# Patient Record
Sex: FEMALE
[De-identification: names, ages, dates, MRNs, and addresses within clinical notes are randomized per-mention and may not be internally consistent; named-entity substitution may affect disease eponyms.]

---

## 2021-07-14 PROBLEM — Z00.00 ENCOUNTER FOR PREVENTIVE HEALTH EXAMINATION: Status: ACTIVE | Noted: 2021-07-14

## 2021-07-21 ENCOUNTER — APPOINTMENT (OUTPATIENT)
Dept: CARDIOLOGY | Facility: CLINIC | Age: 76
End: 2021-07-21
Payer: MEDICARE

## 2021-07-21 ENCOUNTER — NON-APPOINTMENT (OUTPATIENT)
Age: 76
End: 2021-07-21

## 2021-07-21 VITALS — DIASTOLIC BLOOD PRESSURE: 70 MMHG | SYSTOLIC BLOOD PRESSURE: 140 MMHG

## 2021-07-21 VITALS
WEIGHT: 258 LBS | SYSTOLIC BLOOD PRESSURE: 134 MMHG | OXYGEN SATURATION: 95 % | HEART RATE: 107 BPM | BODY MASS INDEX: 38.21 KG/M2 | TEMPERATURE: 97.8 F | DIASTOLIC BLOOD PRESSURE: 80 MMHG | HEIGHT: 69 IN

## 2021-07-21 DIAGNOSIS — Z82.3 FAMILY HISTORY OF STROKE: ICD-10-CM

## 2021-07-21 DIAGNOSIS — E11.9 TYPE 2 DIABETES MELLITUS W/OUT COMPLICATIONS: ICD-10-CM

## 2021-07-21 DIAGNOSIS — M19.90 UNSPECIFIED OSTEOARTHRITIS, UNSPECIFIED SITE: ICD-10-CM

## 2021-07-21 DIAGNOSIS — Z72.3 LACK OF PHYSICAL EXERCISE: ICD-10-CM

## 2021-07-21 DIAGNOSIS — E66.9 OBESITY, UNSPECIFIED: ICD-10-CM

## 2021-07-21 DIAGNOSIS — Z72.89 OTHER PROBLEMS RELATED TO LIFESTYLE: ICD-10-CM

## 2021-07-21 DIAGNOSIS — G62.9 POLYNEUROPATHY, UNSPECIFIED: ICD-10-CM

## 2021-07-21 DIAGNOSIS — R26.81 UNSTEADINESS ON FEET: ICD-10-CM

## 2021-07-21 DIAGNOSIS — Z82.49 FAMILY HISTORY OF ISCHEMIC HEART DISEASE AND OTHER DISEASES OF THE CIRCULATORY SYSTEM: ICD-10-CM

## 2021-07-21 PROCEDURE — 93242 EXT ECG>48HR<7D RECORDING: CPT

## 2021-07-21 PROCEDURE — 93000 ELECTROCARDIOGRAM COMPLETE: CPT

## 2021-07-21 PROCEDURE — 99205 OFFICE O/P NEW HI 60 MIN: CPT

## 2021-07-21 RX ORDER — EMPAGLIFLOZIN 25 MG/1
25 TABLET, FILM COATED ORAL DAILY
Refills: 0 | Status: ACTIVE | COMMUNITY

## 2021-07-21 RX ORDER — INSULIN GLARGINE 100 [IU]/ML
INJECTION, SOLUTION SUBCUTANEOUS
Refills: 0 | Status: ACTIVE | COMMUNITY

## 2021-07-21 RX ORDER — SIMVASTATIN 20 MG/1
20 TABLET, FILM COATED ORAL
Qty: 90 | Refills: 3 | Status: ACTIVE | COMMUNITY

## 2021-07-21 RX ORDER — METFORMIN HYDROCHLORIDE 1000 MG/1
1000 TABLET, COATED ORAL TWICE DAILY
Refills: 0 | Status: ACTIVE | COMMUNITY

## 2021-07-21 RX ORDER — APIXABAN 5 MG/1
5 TABLET, FILM COATED ORAL
Qty: 90 | Refills: 3 | Status: ACTIVE | COMMUNITY

## 2021-07-21 RX ORDER — TEMAZEPAM 15 MG/1
15 CAPSULE ORAL
Refills: 0 | Status: ACTIVE | COMMUNITY

## 2021-07-21 RX ORDER — SEMAGLUTIDE 1.34 MG/ML
2 INJECTION, SOLUTION SUBCUTANEOUS
Refills: 0 | Status: ACTIVE | COMMUNITY

## 2021-07-21 RX ORDER — AMLODIPINE BESYLATE 5 MG/1
5 TABLET ORAL
Qty: 90 | Refills: 1 | Status: ACTIVE | COMMUNITY

## 2021-07-21 NOTE — CARDIOLOGY SUMMARY
[de-identified] : EKG June 23, 2021 atrial fibrillation with rapid ventricular rate low voltage nonspecific ST-T changes.\par EKG July 21, 2021 normal sinus rhythm

## 2021-07-21 NOTE — ASSESSMENT
[FreeTextEntry1] : Reviewed July 2121.\par EKG is reviewed\par Recent labs June 23, 2021 sodium 140 potassium 5.0 creatinine 0.81 LFT normal hemoglobin 14.5 platelet count 279 LDL 65 HDL 51 triglycerides 147 total cholesterol 145 hemoglobin A1c 6.7 GFR 70

## 2021-07-21 NOTE — DISCUSSION/SUMMARY
[FreeTextEntry1] : 75-year-old female with above medical history active medical problems as noted below\par 1.  New diagnosis of paroxysmal atrial fibrillation.\par Pathophysiology reviewed with her.\par Paroxysmal nature of the arrhythmias and relatively asymptomatic state discussed.\par CHADS2 VASC 2 score of 5.  Moderate to high risk category at 7.2% risk of a stroke.\par We have discussed rate versus rhythm control strategy.  \par Risk benefits alternatives of high risk medication\par In the form of Eliquis was reviewed at length.\par Event monitoring applied to evaluate for atrial fibrillation burden\par Echocardiogram to evaluate LV ejection fraction, left atrial size, pulmonary artery systolic pressure.\par Based on above evaluation we can discuss further whether she would benefit from no changes in medication versus antiarrhythmic medication like Multaq or consider EP evaluation for ablation.  \par Avoidance of alcohol reviewed\par Thyroid profile if it is not checked recommended.\par 2.  Lower extremity edema.  Venous insufficiency.  Rule out pulmonary hypertension and right heart failure.\par Furosemide 20 mg added.\par Low-salt diet.  Leg elevation.\par Once edema improves as needed use of furosemide or restarting spironolactone.\par Labs ordered.\par 3.  Essential hypertension.  Reasonably well controlled.  Continue amlodipine atenolol at present.  Though in presence of diabetes mellitus considering renal function is stable would prefer to put her on angiotensin receptor blocker or ACE inhibitor.  We will discuss this further once I have further information.  Goal less than 130/80\par 4.  Hyperlipidemia.  On statin therapy.  LDL less than 70.  Continue lifestyle modifications.  Goal less than 70.\par \par 5.  Obesity.  Lifestyle modification weight reduction is strongly recommended.\par #6 abnormal EKG with tachycardia and atrial fibrillation.  Multiple risk factors for coronary atherosclerotic vascular disease.  Recommended pharmacological myocardial perfusion scan as she is unable to do exercise.  She is at intermediate to high risk for having obstructive coronary artery disease.  This may also allow us and help us to decide about antiarrhythmic medication if needed.\par Technetium 99 radioisotope use was reviewed.  Risks, benefits, alternatives of use of radioisotope was discussed at length.  Patient verbalized understanding and agreed with the management plan.\par \par Counseling regarding low saturated fat, salt and carbohydrate intake was reviewed. Active lifestyle and regular. Exercise along with weight management is advised.\par All the above were at length reviewed. Answered all the questions. Thank you very much for this kind referral. Please do not hesitate to give me a call for any question.\par Part of this transcription was done with voice recognition software and phonetically similar errors are common. I apologize for that. Please do not hesitate to call for any questions due to above.\par Extensive review of the new cardiovascular finding, use of high risk medication, multiple cardiovascular test evaluation management was done.\par

## 2021-07-21 NOTE — HISTORY OF PRESENT ILLNESS
[FreeTextEntry1] : Medical history mainly significant for\par Recent diagnosis of atrial fibrillation.\par Essential hypertension\par Type 2 diabetes mellitus\par Obesity\par Dyslipidemia\par Lower extremity edema\par \par No history of myocardial infarction, cerebrovascular accident, bleeding complications, recent trauma\par

## 2021-07-21 NOTE — REVIEW OF SYSTEMS
[Negative] : Heme/Lymph [Fever] : no fever [Headache] : no headache [Weight Gain (___ Lbs)] : [unfilled] ~Ulb weight gain [Chills] : no chills [Feeling Fatigued] : feeling fatigued [Weight Loss (___ Lbs)] : no recent weight loss [SOB] : shortness of breath [Dyspnea on exertion] : dyspnea during exertion [Chest Discomfort] : no chest discomfort [Lower Ext Edema] : lower extremity edema [Leg Claudication] : no intermittent leg claudication [Palpitations] : no palpitations [Orthopnea] : no orthopnea [PND] : no PND [Syncope] : no syncope [Nausea] : nausea [Joint Pain] : joint pain [Joint Stiffness] : joint stiffness

## 2021-07-21 NOTE — PHYSICAL EXAM
[Well Developed] : well developed [Well Nourished] : well nourished [No Acute Distress] : no acute distress [Normal Conjunctiva] : normal conjunctiva [No Carotid Bruit] : no carotid bruit [Normal S1, S2] : normal S1, S2 [No Murmur] : no murmur [No Rub] : no rub [No Gallop] : no gallop [No Respiratory Distress] : no respiratory distress  [No Cyanosis] : no cyanosis [No Clubbing] : no clubbing [No Varicosities] : no varicosities [No Rash] : no rash [No Skin Lesions] : no skin lesions [Moves all extremities] : moves all extremities [No Focal Deficits] : no focal deficits [Normal Speech] : normal speech [Alert and Oriented] : alert and oriented [Normal memory] : normal memory [Obese] : obese [Elevated JVD ____cm] : elevated JVD ~Vcm [Clear Lung Fields] : clear lung fields [Abnormal Gait] : abnormal gait [Edema ___] : edema [unfilled] [Venous stasis] : venous stasis [de-identified] : Distant heart sounds [de-identified] : Decreased air entry [de-identified] : Obese [de-identified] : Uses cane to walk [de-identified] : Unable to palpate pulses because of edema

## 2021-07-21 NOTE — REASON FOR VISIT
[Symptom and Test Evaluation] : symptom and test evaluation [Arrhythmia/ECG Abnorrmalities] : arrhythmia/ECG abnormalities [Hyperlipidemia] : hyperlipidemia [Hypertension] : hypertension [FreeTextEntry3] : Dr. Sosa [FreeTextEntry1] : 75-year-old white female is referred to me for consultation in presence of recent office visit on June 23, 2021 showed atrial fibrillation.\par According to her she was traveling to [place].  Did not feel well.  Was very nauseous.  Did not feel any palpitations.  Did not have any shortness of breath chest pain dizziness lightheadedness.  She did feel fatigued at the time.\par EKG done at 's office showed atrial fibrillation.  Rapid ventricular rate low voltage complexes nonspecific ST-T changes.  She was started on Eliquis.  And was referred for cardiac consultation.  Since that day she has not had any significant problems.\par Her activity level is very limited.\par She has increasing edema over the last few weeks.  After stopping spironolactone.\par She has exertional dyspnea and dyspnea at rest which is stable.  Without any PND.\par She has no chest pain arm pain jaw pain diaphoresis.\par She has not had any significant cardiac evaluation recently\par No bleeding complications\par No recent hospital admission\par No diagnosis of sleep apnea\par 1 glass of wine a day at the most.

## 2021-08-19 ENCOUNTER — NON-APPOINTMENT (OUTPATIENT)
Age: 76
End: 2021-08-19

## 2021-08-20 PROCEDURE — 93244 EXT ECG>48HR<7D REV&INTERPJ: CPT

## 2021-09-14 ENCOUNTER — APPOINTMENT (OUTPATIENT)
Dept: CARDIOLOGY | Facility: CLINIC | Age: 76
End: 2021-09-14

## 2021-09-21 ENCOUNTER — APPOINTMENT (OUTPATIENT)
Dept: CARDIOLOGY | Facility: CLINIC | Age: 76
End: 2021-09-21

## 2021-10-14 ENCOUNTER — APPOINTMENT (OUTPATIENT)
Dept: CARDIOLOGY | Facility: CLINIC | Age: 76
End: 2021-10-14

## 2021-10-19 ENCOUNTER — APPOINTMENT (OUTPATIENT)
Dept: CARDIOLOGY | Facility: CLINIC | Age: 76
End: 2021-10-19

## 2021-11-17 ENCOUNTER — APPOINTMENT (OUTPATIENT)
Dept: CARDIOLOGY | Facility: CLINIC | Age: 76
End: 2021-11-17
Payer: MEDICARE

## 2021-11-17 VITALS
OXYGEN SATURATION: 98 % | HEART RATE: 79 BPM | DIASTOLIC BLOOD PRESSURE: 80 MMHG | WEIGHT: 232 LBS | TEMPERATURE: 97.1 F | BODY MASS INDEX: 34.36 KG/M2 | SYSTOLIC BLOOD PRESSURE: 124 MMHG | HEIGHT: 69 IN

## 2021-11-17 DIAGNOSIS — R60.0 LOCALIZED EDEMA: ICD-10-CM

## 2021-11-17 DIAGNOSIS — Z79.899 OTHER LONG TERM (CURRENT) DRUG THERAPY: ICD-10-CM

## 2021-11-17 DIAGNOSIS — I47.1 SUPRAVENTRICULAR TACHYCARDIA: ICD-10-CM

## 2021-11-17 DIAGNOSIS — I48.0 PAROXYSMAL ATRIAL FIBRILLATION: ICD-10-CM

## 2021-11-17 DIAGNOSIS — I10 ESSENTIAL (PRIMARY) HYPERTENSION: ICD-10-CM

## 2021-11-17 DIAGNOSIS — Z79.01 LONG TERM (CURRENT) USE OF ANTICOAGULANTS: ICD-10-CM

## 2021-11-17 DIAGNOSIS — I48.91 UNSPECIFIED ATRIAL FIBRILLATION: ICD-10-CM

## 2021-11-17 PROCEDURE — 99214 OFFICE O/P EST MOD 30 MIN: CPT

## 2021-11-17 PROCEDURE — 93306 TTE W/DOPPLER COMPLETE: CPT

## 2021-11-17 RX ORDER — FUROSEMIDE 20 MG/1
20 TABLET ORAL AS DIRECTED
Refills: 0 | Status: ACTIVE | COMMUNITY
Start: 2021-07-21

## 2021-11-17 RX ORDER — ATENOLOL 25 MG/1
25 TABLET ORAL TWICE DAILY
Qty: 180 | Refills: 1 | Status: ACTIVE | COMMUNITY

## 2021-11-17 RX ORDER — SPIRONOLACTONE 25 MG/1
25 TABLET ORAL DAILY
Refills: 0 | Status: ACTIVE | COMMUNITY
Start: 2021-11-17

## 2021-11-17 NOTE — ASSESSMENT
[FreeTextEntry1] : Reviewed July 2121.\par EKG is reviewed\par Recent labs June 23, 2021 sodium 140 potassium 5.0 creatinine 0.81 LFT normal hemoglobin 14.5 platelet count 279 LDL 65 HDL 51 triglycerides 147 total cholesterol 145 hemoglobin A1c 6.7 GFR 70\par \par Reviewed on November 17, 2021\par Event monitor, echocardiogram were reviewed.  I do not have any recent labs since June.

## 2021-11-17 NOTE — PHYSICAL EXAM
[Well Developed] : well developed [Well Nourished] : well nourished [No Acute Distress] : no acute distress [Obese] : obese [Normal Conjunctiva] : normal conjunctiva [No Carotid Bruit] : no carotid bruit [Elevated JVD ____cm] : elevated JVD ~Vcm [Normal S1, S2] : normal S1, S2 [No Murmur] : no murmur [No Rub] : no rub [No Gallop] : no gallop [Clear Lung Fields] : clear lung fields [No Respiratory Distress] : no respiratory distress  [Abnormal Gait] : abnormal gait [No Cyanosis] : no cyanosis [No Clubbing] : no clubbing [No Varicosities] : no varicosities [Edema ___] : edema [unfilled] [Venous stasis] : venous stasis [No Rash] : no rash [No Skin Lesions] : no skin lesions [Moves all extremities] : moves all extremities [No Focal Deficits] : no focal deficits [Normal Speech] : normal speech [Alert and Oriented] : alert and oriented [Normal memory] : normal memory [de-identified] : Distant heart sounds [de-identified] : Decreased air entry [de-identified] : Obese [de-identified] : Uses cane to walk [de-identified] : Unable to palpate pulses because of edema

## 2021-11-17 NOTE — REVIEW OF SYSTEMS
[Weight Gain (___ Lbs)] : [unfilled] ~Ulb weight gain [Feeling Fatigued] : feeling fatigued [Dyspnea on exertion] : dyspnea during exertion [SOB] : shortness of breath [Lower Ext Edema] : lower extremity edema [Joint Pain] : joint pain [Joint Stiffness] : joint stiffness [Negative] : Heme/Lymph [Fever] : no fever [Headache] : no headache [Chills] : no chills [Weight Loss (___ Lbs)] : no recent weight loss [Chest Discomfort] : no chest discomfort [Leg Claudication] : no intermittent leg claudication [Palpitations] : no palpitations [Orthopnea] : no orthopnea [PND] : no PND [Syncope] : no syncope [Nausea] : no nausea

## 2021-11-17 NOTE — DISCUSSION/SUMMARY
[FreeTextEntry1] : 75-year-old female with above medical history active medical problems as noted below\par 1.   paroxysmal atrial fibrillation.\par Paroxysmal nature of the arrhythmias and relatively asymptomatic state discussed.\par CHADS2 VASC 2 score of 5.  Moderate to high risk category at 7.2% risk of a stroke.\par We have discussed rate versus rhythm control strategy.  \par No significant atrial fibrillation in 2 weeks of event monitor.  Brief episodes of PSVT or sequential PACs.\par Echocardiogram shows preserved LV systolic function.  No significant pulmonary hypertension.  Normal left atrium.\par Labs ordered.\par Importance of regular labs in presence of high risk medication use was reviewed.  Watch for bleeding complications.\par 2.  Lower extremity edema.  Venous insufficiency.  No significant evidence of pulmonary pretension or right heart failure on echocardiogram.  Still persistent edema.  She will take furosemide only as needed.  She will continue with her spironolactone.  Does understand need for close follow-up on labs..\par Low-salt diet.  Leg elevation.\par Labs ordered.\par And consider compression stockings.\par 3.  Essential hypertension.  Reasonably well controlled.  Continue amlodipine, atenolol at present.  Though in presence of diabetes mellitus considering renal function is stable would prefer to put her on angiotensin receptor blocker or ACE inhibitor.  We will discuss this further once I have further information.  Goal less than 130/80\par 4.  Hyperlipidemia.  On statin therapy.  LDL less than 70.  Continue lifestyle modifications.  Goal less than 70.\par 5.  Obesity.  Lifestyle modification weight reduction is strongly recommended.\par 6 abnormal EKG with tachycardia and atrial fibrillation.  Multiple risk factors for coronary atherosclerotic vascular disease.  Recommended pharmacological myocardial perfusion scan as she is unable to do exercise.  She is at intermediate to high risk for having obstructive coronary artery disease.  This may also allow us and help us to decide about antiarrhythmic medication if needed.\par She declined to do myocardial perfusion scan.  Understands risk benefits alternatives.  Continue lifestyle and risk factor modifications.\par \par Counseling regarding low saturated fat, salt and carbohydrate intake was reviewed. Active lifestyle and regular. Exercise along with weight management is advised.\par All the above were at length reviewed. Answered all the questions. Thank you very much for this kind referral. Please do not hesitate to give me a call for any question.\par Part of this transcription was done with voice recognition software and phonetically similar errors are common. I apologize for that. Please do not hesitate to call for any questions due to above.\par Extensive review of the new cardiovascular finding, use of high risk medication, multiple cardiovascular test evaluation management was done.\par

## 2021-11-17 NOTE — REASON FOR VISIT
[Symptom and Test Evaluation] : symptom and test evaluation [Arrhythmia/ECG Abnorrmalities] : arrhythmia/ECG abnormalities [Hyperlipidemia] : hyperlipidemia [Hypertension] : hypertension [FreeTextEntry3] : Dr. Sosa [FreeTextEntry1] : 75-year-old white female is referred to me for follow-up consultation in presence of recent office visit on June 23, 2021 showed atrial fibrillation.  Reviewed event monitor, echocardiogram.\par As you know recently she was traveling to [place].  Did not feel well.  Was very nauseous.  Did not feel any palpitations.  Did not have any shortness of breath chest pain dizziness lightheadedness.  She did feel fatigued at the time.\par EKG done at 's office showed atrial fibrillation.  Rapid ventricular rate low voltage complexes nonspecific ST-T changes.  She was started on Eliquis.  And was referred for cardiac consultation.  Since that day she has not had any significant problems.\par Her activity level is very limited.\par She has not been taking furosemide regularly.  She has restarted taking her spironolactone as advised by your office at the half a tablet daily.  She does have edema though according to her has improved significantly.\par She has exertional dyspnea and dyspnea at rest which is stable.  Without any PND.\par She has no chest pain arm pain jaw pain diaphoresis.\par She has not had any significant cardiac evaluation recently\par No bleeding complications\par No recent hospital admission\par No diagnosis of sleep apnea\par 1 glass of wine a day at the most.

## 2021-11-17 NOTE — CARDIOLOGY SUMMARY
[de-identified] : EKG June 23, 2021 atrial fibrillation with rapid ventricular rate low voltage nonspecific ST-T changes.\par EKG July 21, 2021 normal sinus rhythm [de-identified] : Event monitoring.  3 days 16 hours.  July 21, 2021.  Normal sinus rhythm average 77 occasional APC.  Sequential APCs up to 8 beats.  Rare isolated VPC. [de-identified] : November 17, 2021.  Preserved LV systolic function. LVEf 55-60%  Mitral annular calcification.  Aortic valve thickening.

## 2022-06-27 ENCOUNTER — OFFICE (OUTPATIENT)
Dept: URBAN - METROPOLITAN AREA CLINIC 8 | Facility: CLINIC | Age: 77
Setting detail: OPHTHALMOLOGY
End: 2022-06-27
Payer: MEDICARE

## 2022-06-27 ENCOUNTER — OFFICE (OUTPATIENT)
Dept: URBAN - METROPOLITAN AREA CLINIC 8 | Facility: CLINIC | Age: 77
Setting detail: OPHTHALMOLOGY
End: 2022-06-27

## 2022-06-27 DIAGNOSIS — H90.3: ICD-10-CM

## 2022-06-27 DIAGNOSIS — H25.13: ICD-10-CM

## 2022-06-27 DIAGNOSIS — E11.9: ICD-10-CM

## 2022-06-27 PROCEDURE — 92004 COMPRE OPH EXAM NEW PT 1/>: CPT | Performed by: OPHTHALMOLOGY

## 2022-06-27 PROCEDURE — 92551 PURE TONE HEARING TEST AIR: CPT | Performed by: AUDIOLOGIST

## 2022-06-27 PROCEDURE — 92567 TYMPANOMETRY: CPT | Performed by: AUDIOLOGIST

## 2022-06-27 ASSESSMENT — KERATOMETRY
METHOD_AUTO_MANUAL: AUTO
OS_K1POWER_DIOPTERS: 44.00
OS_K2POWER_DIOPTERS: 44.25
OD_AXISANGLE_DEGREES: 090
OD_K2POWER_DIOPTERS: 44.25
OD_K1POWER_DIOPTERS: 44.25
OS_AXISANGLE_DEGREES: 058

## 2022-06-27 ASSESSMENT — REFRACTION_MANIFEST
OD_AXIS: 095
OD_VA1: 20/25-2
OD_CYLINDER: -1.00
OS_VA1: 20/30
OD_SPHERE: +0.50
OD_SPHERE: +0.50
OD_VA1: 20/25-2
OU_VA: 20/25-2
OS_CYLINDER: -0.50
OD_CYLINDER: -1.25
OS_AXIS: 095
OD_VA2: 20/20(J1+)
OS_SPHERE: +0.50
OS_VA1: 20/30
OS_ADD: +2.75
OS_VA2: 20/20(J1+)
OD_ADD: +2.75
OD_ADD: +2.75
OS_ADD: +2.75
OD_VA2: 20/20(J1+)
OU_VA: 20/25-2
OS_CYLINDER: -0.50
OS_SPHERE: +0.50
OS_AXIS: 095
OD_AXIS: 095
OS_VA2: 20/20(J1+)

## 2022-06-27 ASSESSMENT — REFRACTION_CURRENTRX
OD_VPRISM_DIRECTION: SV
OS_CYLINDER: -1.25
OS_SPHERE: +3.25
OS_OVR_VA: 20/
OD_SPHERE: +3.25
OD_AXIS: 101
OS_VPRISM_DIRECTION: SV
OS_AXIS: 093
OD_CYLINDER: -1.00
OD_OVR_VA: 20/

## 2022-06-27 ASSESSMENT — REFRACTION_AUTOREFRACTION
OD_CYLINDER: -2.00
OS_AXIS: 095
OD_AXIS: 096
OS_SPHERE: +1.00
OS_CYLINDER: -1.00
OD_SPHERE: +1.00

## 2022-06-27 ASSESSMENT — SPHEQUIV_DERIVED
OD_SPHEQUIV: 0
OS_SPHEQUIV: 0.25
OD_SPHEQUIV: 0
OS_SPHEQUIV: 0.5
OD_SPHEQUIV: -0.125
OS_SPHEQUIV: 0.25

## 2022-06-27 ASSESSMENT — CONFRONTATIONAL VISUAL FIELD TEST (CVF)
OS_FINDINGS: FULL
OD_FINDINGS: FULL

## 2022-06-27 ASSESSMENT — VISUAL ACUITY
OD_BCVA: 20/30-2
OS_BCVA: 20/30

## 2022-06-27 ASSESSMENT — AXIALLENGTH_DERIVED
OS_AL: 23.2694
OD_AL: 23.3673
OS_AL: 23.175
OD_AL: 23.3196
OD_AL: 23.3196
OS_AL: 23.2694

## 2022-11-09 ENCOUNTER — APPOINTMENT (OUTPATIENT)
Dept: CARDIOLOGY | Facility: CLINIC | Age: 77
End: 2022-11-09

## 2023-01-11 ENCOUNTER — APPOINTMENT (OUTPATIENT)
Dept: CARDIOLOGY | Facility: CLINIC | Age: 78
End: 2023-01-11

## 2023-05-09 ENCOUNTER — APPOINTMENT (OUTPATIENT)
Dept: CARDIOLOGY | Facility: CLINIC | Age: 78
End: 2023-05-09

## 2024-01-30 NOTE — PHYSICAL EXAM
[Well Developed] : well developed [Well Nourished] : well nourished [No Acute Distress] : no acute distress [Obese] : obese [Normal Conjunctiva] : normal conjunctiva [No Carotid Bruit] : no carotid bruit [Elevated JVD ____cm] : elevated JVD ~Vcm [Normal S1, S2] : normal S1, S2 [No Murmur] : no murmur [No Rub] : no rub [No Gallop] : no gallop [Clear Lung Fields] : clear lung fields [No Respiratory Distress] : no respiratory distress  [Abnormal Gait] : abnormal gait [No Cyanosis] : no cyanosis [No Clubbing] : no clubbing [No Varicosities] : no varicosities [Edema ___] : edema [unfilled] [Venous stasis] : venous stasis [No Rash] : no rash [No Skin Lesions] : no skin lesions [Moves all extremities] : moves all extremities [No Focal Deficits] : no focal deficits [Normal Speech] : normal speech [Alert and Oriented] : alert and oriented [Normal memory] : normal memory [de-identified] : Distant heart sounds [de-identified] : Decreased air entry [de-identified] : Obese [de-identified] : Uses cane to walk [de-identified] : Unable to palpate pulses because of edema

## 2024-01-30 NOTE — PHYSICAL EXAM
[Well Developed] : well developed [Well Nourished] : well nourished [No Acute Distress] : no acute distress [Obese] : obese [Normal Conjunctiva] : normal conjunctiva [No Carotid Bruit] : no carotid bruit [Elevated JVD ____cm] : elevated JVD ~Vcm [Normal S1, S2] : normal S1, S2 [No Murmur] : no murmur [No Gallop] : no gallop [No Rub] : no rub [Clear Lung Fields] : clear lung fields [No Respiratory Distress] : no respiratory distress  [Abnormal Gait] : abnormal gait [No Cyanosis] : no cyanosis [No Clubbing] : no clubbing [No Varicosities] : no varicosities [Edema ___] : edema [unfilled] [Venous stasis] : venous stasis [No Rash] : no rash [No Skin Lesions] : no skin lesions [Moves all extremities] : moves all extremities [No Focal Deficits] : no focal deficits [Normal Speech] : normal speech [Normal memory] : normal memory [Alert and Oriented] : alert and oriented [de-identified] : Distant heart sounds [de-identified] : Decreased air entry [de-identified] : Obese [de-identified] : Uses cane to walk [de-identified] : Unable to palpate pulses because of edema

## 2024-05-13 ENCOUNTER — RX ONLY (RX ONLY)
Age: 79
End: 2024-05-13

## 2024-05-13 ENCOUNTER — OFFICE (OUTPATIENT)
Dept: URBAN - METROPOLITAN AREA CLINIC 8 | Facility: CLINIC | Age: 79
Setting detail: OPHTHALMOLOGY
End: 2024-05-13
Payer: MEDICARE

## 2024-05-13 DIAGNOSIS — H16.223: ICD-10-CM

## 2024-05-13 DIAGNOSIS — E11.9: ICD-10-CM

## 2024-05-13 DIAGNOSIS — H52.4: ICD-10-CM

## 2024-05-13 DIAGNOSIS — H25.13: ICD-10-CM

## 2024-05-13 PROCEDURE — 92015 DETERMINE REFRACTIVE STATE: CPT | Performed by: OPHTHALMOLOGY

## 2024-05-13 PROCEDURE — 92134 CPTRZ OPH DX IMG PST SGM RTA: CPT | Performed by: OPHTHALMOLOGY

## 2024-05-13 PROCEDURE — 92014 COMPRE OPH EXAM EST PT 1/>: CPT | Performed by: OPHTHALMOLOGY

## 2024-05-13 ASSESSMENT — CONFRONTATIONAL VISUAL FIELD TEST (CVF)
OS_FINDINGS: FULL
OD_FINDINGS: FULL

## 2024-09-24 ENCOUNTER — APPOINTMENT (OUTPATIENT)
Dept: CARDIOLOGY | Facility: CLINIC | Age: 79
End: 2024-09-24

## 2024-09-24 NOTE — CARDIOLOGY SUMMARY
[de-identified] : EKG June 23, 2021 atrial fibrillation with rapid ventricular rate low voltage nonspecific ST-T changes.\par  EKG July 21, 2021 normal sinus rhythm [de-identified] : Event monitoring.  3 days 16 hours.  July 21, 2021.  Normal sinus rhythm average 77 occasional APC.  Sequential APCs up to 8 beats.  Rare isolated VPC. [de-identified] : November 17, 2021.  Preserved LV systolic function. LVEf 55-60%  Mitral annular calcification.  Aortic valve thickening.

## 2024-09-24 NOTE — CARDIOLOGY SUMMARY
[de-identified] : EKG June 23, 2021 atrial fibrillation with rapid ventricular rate low voltage nonspecific ST-T changes.\par  EKG July 21, 2021 normal sinus rhythm [de-identified] : Event monitoring.  3 days 16 hours.  July 21, 2021.  Normal sinus rhythm average 77 occasional APC.  Sequential APCs up to 8 beats.  Rare isolated VPC. [de-identified] : November 17, 2021.  Preserved LV systolic function. LVEf 55-60%  Mitral annular calcification.  Aortic valve thickening.

## 2024-09-24 NOTE — REVIEW OF SYSTEMS
[Weight Gain (___ Lbs)] : [unfilled] ~Ulb weight gain [Feeling Fatigued] : feeling fatigued [SOB] : shortness of breath [Dyspnea on exertion] : dyspnea during exertion [Lower Ext Edema] : lower extremity edema [Joint Pain] : joint pain [Joint Stiffness] : joint stiffness [Negative] : Heme/Lymph [Fever] : no fever [Headache] : no headache [Chills] : no chills [Weight Loss (___ Lbs)] : no recent weight loss [Chest Discomfort] : no chest discomfort [Leg Claudication] : no intermittent leg claudication [Palpitations] : no palpitations [Orthopnea] : no orthopnea [PND] : no PND [Syncope] : no syncope [Nausea] : no nausea

## 2024-12-13 ASSESSMENT — REFRACTION_MANIFEST
OD_SPHERE: +0.25
OS_VA1: 20/40
OD_ADD: +3.25
OS_VA2: 20/25(J1)
OD_CYLINDER: -2.00
OU_VA: 20/40
OS_SPHERE: +0.25
OS_VA2: 20/25(J1)
OD_VA2: 20/25(J1)
OD_SPHERE: +0.50
OD_ADD: +3.00
OD_CYLINDER: -1.50
OD_VA1: 20/40
OS_AXIS: 085
OS_SPHERE: +0.25
OS_VA1: 20/40
OD_AXIS: 085
OS_AXIS: 085
OS_ADD: +3.00
OU_VA: 20/40
OD_AXIS: 085
OD_VA2: 20/25(J1)
OS_CYLINDER: -1.25
OD_VA1: 20/40
OS_CYLINDER: -1.25
OS_ADD: +3.25

## 2024-12-13 ASSESSMENT — REFRACTION_CURRENTRX
OD_AXIS: 101
OD_OVR_VA: 20/
OS_CYLINDER: -1.25
OS_OVR_VA: 20/
OD_CYLINDER: -1.00
OS_VPRISM_DIRECTION: SV
OS_AXIS: 093
OS_SPHERE: +3.25
OD_VPRISM_DIRECTION: SV
OD_SPHERE: +3.25

## 2024-12-13 ASSESSMENT — KERATOMETRY
OD_K1POWER_DIOPTERS: 43.75
METHOD_AUTO_MANUAL: AUTO
OS_K1POWER_DIOPTERS: 44.00
OS_AXISANGLE_DEGREES: 024
OD_K2POWER_DIOPTERS: 44.50
OD_AXISANGLE_DEGREES: 167
OS_K2POWER_DIOPTERS: 44.50

## 2024-12-25 PROBLEM — F10.90 ALCOHOL USE: Status: ACTIVE | Noted: 2021-07-21

## 2025-05-13 ENCOUNTER — OFFICE (OUTPATIENT)
Dept: URBAN - METROPOLITAN AREA CLINIC 8 | Facility: CLINIC | Age: 80
Setting detail: OPHTHALMOLOGY
End: 2025-05-13
Payer: MEDICARE

## 2025-05-13 DIAGNOSIS — H25.13: ICD-10-CM

## 2025-05-13 DIAGNOSIS — H16.223: ICD-10-CM

## 2025-05-13 DIAGNOSIS — H52.4: ICD-10-CM

## 2025-05-13 PROCEDURE — 92014 COMPRE OPH EXAM EST PT 1/>: CPT

## 2025-05-13 PROCEDURE — 92015 DETERMINE REFRACTIVE STATE: CPT

## 2025-05-13 ASSESSMENT — REFRACTION_CURRENTRX
OD_SPHERE: +3.25
OS_AXIS: 093
OD_AXIS: 101
OD_OVR_VA: 20/
OS_CYLINDER: -1.25
OS_VPRISM_DIRECTION: SV
OS_OVR_VA: 20/
OD_CYLINDER: -1.00
OS_SPHERE: +3.25
OD_VPRISM_DIRECTION: SV

## 2025-05-13 ASSESSMENT — REFRACTION_MANIFEST
OS_CYLINDER: -1.25
OS_ADD: +3.25
OS_SPHERE: +0.25
OU_VA: 20/30
OD_VA1: 20/30-
OD_VA1: 20/40
OD_CYLINDER: -1.50
OD_ADD: +3.25
OD_AXIS: 085
OD_VA2: 20/25(J1)
OD_ADD: +3.25
OD_VA2: 20/25(J1)
OD_CYLINDER: -1.75
OS_VA2: 20/25(J1)
OS_VA1: 20/40
OD_SPHERE: +0.25
OD_AXIS: 095
OS_SPHERE: +0.25
OD_SPHERE: +0.25
OS_CYLINDER: -1.25
OU_VA: 20/40
OS_VA2: 20/25(J1)
OS_ADD: +3.25
OS_VA1: 20/40
OS_AXIS: 090
OS_AXIS: 085

## 2025-05-13 ASSESSMENT — KERATOMETRY
OS_AXISANGLE_DEGREES: 028
METHOD_AUTO_MANUAL: AUTO
OS_K1POWER_DIOPTERS: 43.75
OD_K1POWER_DIOPTERS: 43.50
OD_K2POWER_DIOPTERS: 44.50
OS_K2POWER_DIOPTERS: 44.25
OD_AXISANGLE_DEGREES: 173

## 2025-05-13 ASSESSMENT — VISUAL ACUITY
OD_BCVA: 20/50
OS_BCVA: 20/40

## 2025-05-13 ASSESSMENT — REFRACTION_AUTOREFRACTION
OS_SPHERE: +0.50
OD_SPHERE: +0.50
OS_AXIS: 087
OD_CYLINDER: -2.25
OS_CYLINDER: -1.25
OD_AXIS: 092

## 2025-05-13 ASSESSMENT — CONFRONTATIONAL VISUAL FIELD TEST (CVF)
OD_FINDINGS: FULL
OS_FINDINGS: FULL

## 2025-08-06 ENCOUNTER — APPOINTMENT (OUTPATIENT)
Dept: CARDIOLOGY | Facility: CLINIC | Age: 80
End: 2025-08-06